# Patient Record
Sex: MALE | Race: WHITE | NOT HISPANIC OR LATINO | Employment: FULL TIME | URBAN - METROPOLITAN AREA
[De-identification: names, ages, dates, MRNs, and addresses within clinical notes are randomized per-mention and may not be internally consistent; named-entity substitution may affect disease eponyms.]

---

## 2017-08-25 ENCOUNTER — OFFICE VISIT (OUTPATIENT)
Dept: URGENT CARE | Facility: CLINIC | Age: 32
End: 2017-08-25
Payer: COMMERCIAL

## 2017-08-25 ENCOUNTER — HOSPITAL ENCOUNTER (OUTPATIENT)
Facility: MEDICAL CENTER | Age: 32
End: 2017-08-25
Attending: FAMILY MEDICINE
Payer: COMMERCIAL

## 2017-08-25 VITALS
SYSTOLIC BLOOD PRESSURE: 98 MMHG | HEART RATE: 58 BPM | BODY MASS INDEX: 26.18 KG/M2 | TEMPERATURE: 98.6 F | DIASTOLIC BLOOD PRESSURE: 60 MMHG | HEIGHT: 76 IN | WEIGHT: 215 LBS | OXYGEN SATURATION: 97 %

## 2017-08-25 DIAGNOSIS — L72.3 INFECTED SEBACEOUS CYST: ICD-10-CM

## 2017-08-25 DIAGNOSIS — L08.9 INFECTED SEBACEOUS CYST: ICD-10-CM

## 2017-08-25 PROCEDURE — 99000 SPECIMEN HANDLING OFFICE-LAB: CPT | Performed by: FAMILY MEDICINE

## 2017-08-25 PROCEDURE — 10061 I&D ABSCESS COMP/MULTIPLE: CPT | Performed by: FAMILY MEDICINE

## 2017-08-25 PROCEDURE — 87070 CULTURE OTHR SPECIMN AEROBIC: CPT

## 2017-08-25 RX ORDER — DIPHENHYDRAMINE HCL 25 MG
25 CAPSULE ORAL EVERY 6 HOURS PRN
COMMUNITY

## 2017-08-25 RX ORDER — SULFAMETHOXAZOLE AND TRIMETHOPRIM 800; 160 MG/1; MG/1
1 TABLET ORAL EVERY 12 HOURS
Qty: 20 TAB | Refills: 0 | Status: SHIPPED | OUTPATIENT
Start: 2017-08-25 | End: 2017-09-04

## 2017-08-25 NOTE — MR AVS SNAPSHOT
"        Judson oSng   2017 5:30 PM   Office Visit   MRN: [id number]    Department:  [facility]   Dept Phone:  [phone]    Description:  Male : 1985   Provider:  David Sheikh M.D.           Reason for Visit     Nodule X 2 days, ingrown ronen bump has gotten bigger, discharge, pressure       Allergies as of 2017     No Known Allergies      Vital Signs     Blood Pressure Pulse Temperature Height Weight Body Mass Index    98/60 mmHg 58 37 °C (98.6 °F) 1.93 m (6' 3.98\") 97.523 kg (215 lb) 26.18 kg/m2    Oxygen Saturation Smoking Status                97% Never Smoker           Basic Information     Date Of Birth Sex Race Ethnicity Preferred Language    1985 Male White Non- English      Health Maintenance        Date Due Completion Dates    IMM DTaP/Tdap/Td Vaccine (1 - Tdap) 2003 ---    IMM INFLUENZA (1) 2017 ---            Current Immunizations     No immunizations on file.      Below and/or attached are the medications your provider expects you to take. Review all of your home medications and newly ordered medications with your provider and/or pharmacist. Follow medication instructions as directed by your provider and/or pharmacist. Please keep your medication list with you and share with your provider. Update the information when medications are discontinued, doses are changed, or new medications (including over-the-counter products) are added; and carry medication information at all times in the event of emergency situations     Allergies:  No Known Allergies          Medications  Valid as of: 2017 -  5:47 PM    Generic Name Brand Name Tablet Size Instructions for use    Amoxicillin-Pot Clavulanate (Tab) AUGMENTIN 875-125 MG Take 1 Tab by mouth 2 times a day.        DiphenhydrAMINE HCl (Cap) BENADRYL 25 MG Take 25 mg by mouth every 6 hours as needed.        Diphenoxylate-Atropine (Tab) LOMOTIL 2.5-0.025 MG Take 2 Tabs by mouth 4 times a day as " needed for Diarrhea.        Hydrocodone-Acetaminophen (Tab) NORCO 5-325 MG Take 1-2 Tabs by mouth every 6 hours as needed.        Ibuprofen (Tab) MOTRIN 200 MG Take 200 mg by mouth every 6 hours as needed.        MethylPREDNISolone (Kit) MEDROL DOSEPACK 4 MG follow package directions        Pseudoephedrine HCl (TABLET SR 12 HR) SUDAFED  MG Take 1 Tab by mouth 2 times a day as needed for Congestion.        Scopolamine Base (PATCH 72 HR) TRANSDERM-SCOP 1 MG/3DAYS Apply 1 Patch to skin as directed every 72 hours.        .                 Medicines prescribed today were sent to:     [facility] [address] [place] [address] (), [place] - [address]    [address] () [place] [address]    Phone: [phone] Fax: [phone]    Open 24 Hours?: No      Medication refill instructions:       If your prescription bottle indicates you have medication refills left, it is not necessary to call your provider’s office. Please contact your pharmacy and they will refill your medication.    If your prescription bottle indicates you do not have any refills left, you may request refills at any time through one of the following ways: The online NeedFeed system (except Urgent Care), by calling your provider’s office, or by asking your pharmacy to contact your provider’s office with a refill request. Medication refills are processed only during regular business hours and may not be available until the next business day. Your provider may request additional information or to have a follow-up visit with you prior to refilling your medication.   *Please Note: Medication refills are assigned a new Rx number when refilled electronically. Your pharmacy may indicate that no refills were authorized even though a new prescription for the same medication is available at the pharmacy. Please request the medicine by name with the pharmacy before contacting your provider for a refill.           NeedFeed Access Code: [email or web address]  Expires:  9/24/2017  5:47 PM    Your email address is not on file at [facility].  Email Addresses are required for you to sign up for OneName, please contact [phone] to verify your personal information and to provide your email address prior to attempting to register for OneName.    Judson Song  [address]  [place] [address]    MyChart  A secure, online tool to manage your health information     [facility]’s OneName® is a secure, online tool that connects you to your personalized health information from the privacy of your home -- day or night - making it very easy for you to manage your healthcare. Once the activation process is completed, you can even access your medical information using the OneName bong, which is available for free in the Apple Bong store or Google Play store.     To learn more about OneName, visit [email or web address]    There are two levels of access available (as shown below):   My Chart Features  [facility] Primary Care Doctor [facility]  Specialists [facility]  Urgent  Care Non-[facility] Primary Care Doctor   Email your healthcare team securely and privately 24/7 X X X    Manage appointments: schedule your next appointment; view details of past/upcoming appointments X      Request prescription refills. X      View recent personal medical records, including lab and immunizations X X X X   View health record, including health history, allergies, medications X X X X   Read reports about your outpatient visits, procedures, consult and ER notes X X X X   See your discharge summary, which is a recap of your hospital and/or ER visit that includes your diagnosis, lab results, and care plan X X  X     How to register for Fetchnotest:  Once your e-mail address has been verified, follow the following steps to sign up for Fetchnotest.     1. Go to  [email or web address]  2. Click on the Sign Up Now box, which takes you to the New Member Sign Up page. You will need to provide the following  information:  a. Enter your [employer] Access Code exactly as it appears at the top of this page. (You will not need to use this code after you’ve completed the sign-up process. If you do not sign up before the expiration date, you must request a new code.)   b. Enter your date of birth.   c. Enter your home email address.   d. Click Submit, and follow the next screen’s instructions.  3. Create a [employer] ID. This will be your [employer] login ID and cannot be changed, so think of one that is secure and easy to remember.  4. Create a [employer] password. You can change your password at any time.  5. Enter your Password Reset Question and Answer. This can be used at a later time if you forget your password.   6. Enter your e-mail address. This allows you to receive e-mail notifications when new information is available in [employer].  7. Click Sign Up. You can now view your health information.    For assistance activating your [employer] account, call [phone]

## 2017-08-26 LAB
AMBIGUOUS DTTM AMBI4: NORMAL
SIGNIFICANT IND 70042: NORMAL
SITE SITE: NORMAL
SOURCE SOURCE: NORMAL

## 2017-08-27 ENCOUNTER — OFFICE VISIT (OUTPATIENT)
Dept: URGENT CARE | Facility: CLINIC | Age: 32
End: 2017-08-27
Payer: COMMERCIAL

## 2017-08-27 VITALS
BODY MASS INDEX: 26.79 KG/M2 | OXYGEN SATURATION: 99 % | RESPIRATION RATE: 16 BRPM | HEART RATE: 54 BPM | HEIGHT: 76 IN | WEIGHT: 220 LBS | TEMPERATURE: 98.5 F | DIASTOLIC BLOOD PRESSURE: 70 MMHG | SYSTOLIC BLOOD PRESSURE: 102 MMHG

## 2017-08-27 DIAGNOSIS — Z51.89 WOUND CHECK, ABSCESS: ICD-10-CM

## 2017-08-27 ASSESSMENT — ENCOUNTER SYMPTOMS
MUSCULOSKELETAL NEGATIVE: 1
NEUROLOGICAL NEGATIVE: 1
CONSTITUTIONAL NEGATIVE: 1

## 2017-08-27 ASSESSMENT — PATIENT HEALTH QUESTIONNAIRE - PHQ9: CLINICAL INTERPRETATION OF PHQ2 SCORE: 0

## 2017-08-27 NOTE — PROGRESS NOTES
"Subjective:      Judson Song is a 32 y.o. male who presents with Wound Check (Cyst removed on (R) side of face)            Wound Check   He was originally treated 2 to 3 days ago (face cyst s/p i/d; improving). Previous treatment included I&D of abscess. His temperature was unmeasured prior to arrival. There has been bloody discharge from the wound. The redness has improved. The swelling has improved. The pain has improved. He has no difficulty moving the affected extremity or digit.       Review of Systems   Constitutional: Negative.    HENT: Negative.    Musculoskeletal: Negative.    Skin: Negative.    Neurological: Negative.           Objective:     /70   Pulse (!) 54   Temp 36.9 °C (98.5 °F)   Resp 16   Ht 1.93 m (6' 3.98\")   Wt 99.8 kg (220 lb)   SpO2 99%   BMI 26.79 kg/m²      Physical Exam   Constitutional: He is oriented to person, place, and time. He appears well-developed and well-nourished. No distress.   HENT:   Head: Normocephalic and atraumatic.   Cyst area cleaned, irrig; abx dssg   Eyes: EOM are normal. Pupils are equal, round, and reactive to light.   Neck: Normal range of motion. Neck supple.   Lymphadenopathy:     He has no cervical adenopathy.   Neurological: He is alert and oriented to person, place, and time.   Skin: Skin is warm and dry.   Psychiatric: He has a normal mood and affect. His behavior is normal. Judgment and thought content normal.   Nursing note and vitals reviewed.              Assessment/Plan:     ·  wound check      · Local wound care      "

## 2017-08-30 LAB
BACTERIA WND AEROBE CULT: NORMAL
SIGNIFICANT IND 70042: NORMAL
SITE SITE: NORMAL
SOURCE SOURCE: NORMAL

## 2017-08-30 ASSESSMENT — ENCOUNTER SYMPTOMS: BRUISES/BLEEDS EASILY: 0

## 2017-08-30 NOTE — PROGRESS NOTES
"Subjective:      Judson Song is a 32 y.o. male who presents with Nodule (X 2 days, ingrown ronen bump has gotten bigger, discharge, pressure )            2 days right neck abscess. Red, swollen, scant drainage. Moderate pain. No fever. He may have had a small bump in the region chronically.         Review of Systems   HENT:        No dysphagia   Skin: Negative for itching and rash.   Endo/Heme/Allergies: Does not bruise/bleed easily.          Objective:     BP (!) 98/60   Pulse (!) 58   Temp 37 °C (98.6 °F)   Ht 1.93 m (6' 3.98\")   Wt 97.5 kg (215 lb)   SpO2 97%   BMI 26.18 kg/m²      Physical Exam   Constitutional: He appears well-developed and well-nourished. No distress.   HENT:   Head: Normocephalic and atraumatic.   Mouth/Throat: Oropharynx is clear and moist.   Neck: Normal range of motion. Neck supple.       Neurological:   Speech is clear. Patient is appropriate and cooperative.     Skin: Skin is warm and dry. No rash noted.          Procedure: Incision and Drainage  -Risks, benefits, and alternatives discussed. Risks including infection, bleeding, nerve damage, and poor cosmetic outcome  -Sterile technique throughout  -Local anesthesia with 2% lidocaine with epinephrine  -Incision with #11 blade into fluctuant area with purulent and sebaceous material expressed  -Culture obtained and packaged for lab  -Cavity probed and any loculations bluntly taken down with hemostat  -Irrigated copiously with NS  -Packed with 1/4\" gauze  -Minimal bleeding with good hemostasis achieved  -The patient tolerated the procedure well         Assessment/Plan:     1. Infected sebaceous cyst    - CULTURE WOUND W/O GRAM STAIN; Future  - sulfamethoxazole-trimethoprim (BACTRIM DS) 800-160 MG tablet; Take 1 Tab by mouth every 12 hours for 10 days.  Dispense: 20 Tab; Refill: 0  - f/u 48hr for recheck    "